# Patient Record
Sex: MALE | Race: WHITE | NOT HISPANIC OR LATINO | URBAN - METROPOLITAN AREA
[De-identification: names, ages, dates, MRNs, and addresses within clinical notes are randomized per-mention and may not be internally consistent; named-entity substitution may affect disease eponyms.]

---

## 2024-10-21 ENCOUNTER — OFFICE VISIT (OUTPATIENT)
Dept: URGENT CARE | Facility: CLINIC | Age: 54
End: 2024-10-21
Payer: COMMERCIAL

## 2024-10-21 VITALS
TEMPERATURE: 98 F | HEART RATE: 73 BPM | BODY MASS INDEX: 44.08 KG/M2 | WEIGHT: 297.63 LBS | OXYGEN SATURATION: 98 % | HEIGHT: 69 IN | RESPIRATION RATE: 18 BRPM | DIASTOLIC BLOOD PRESSURE: 86 MMHG | SYSTOLIC BLOOD PRESSURE: 128 MMHG

## 2024-10-21 DIAGNOSIS — S61.431A PUNCTURE WOUND OF RIGHT HAND WITHOUT FOREIGN BODY, INITIAL ENCOUNTER: Primary | ICD-10-CM

## 2024-10-21 DIAGNOSIS — M79.641 RIGHT HAND PAIN: ICD-10-CM

## 2024-10-21 PROBLEM — E78.5 DYSLIPIDEMIA: Status: ACTIVE | Noted: 2023-03-13

## 2024-10-21 PROBLEM — Z51.81 ANTICOAGULATION MANAGEMENT ENCOUNTER: Status: ACTIVE | Noted: 2023-03-13

## 2024-10-21 PROBLEM — Z79.01 ANTICOAGULATION MANAGEMENT ENCOUNTER: Status: ACTIVE | Noted: 2023-03-13

## 2024-10-21 PROBLEM — I25.10 ISCHEMIC DILATED CARDIOMYOPATHY DUE TO CORONARY ARTERY DISEASE: Status: ACTIVE | Noted: 2024-10-21

## 2024-10-21 PROBLEM — G47.33 OBSTRUCTIVE SLEEP APNEA: Status: ACTIVE | Noted: 2023-03-13

## 2024-10-21 PROBLEM — Z95.810 IMPLANTABLE CARDIOVERTER-DEFIBRILLATOR (ICD) IN SITU: Status: ACTIVE | Noted: 2024-10-21

## 2024-10-21 PROBLEM — F31.9 BIPOLAR DISORDER: Status: ACTIVE | Noted: 2024-10-21

## 2024-10-21 PROBLEM — E66.9 OBESITY: Status: ACTIVE | Noted: 2023-03-13

## 2024-10-21 PROBLEM — E11.9 DIABETES MELLITUS: Status: ACTIVE | Noted: 2023-03-13

## 2024-10-21 PROBLEM — I25.10 CORONARY ARTERY DISEASE INVOLVING NATIVE CORONARY ARTERY WITHOUT ANGINA PECTORIS: Status: ACTIVE | Noted: 2023-03-13

## 2024-10-21 PROBLEM — I25.5 ISCHEMIC DILATED CARDIOMYOPATHY DUE TO CORONARY ARTERY DISEASE: Status: ACTIVE | Noted: 2024-10-21

## 2024-10-21 PROBLEM — I50.20 HEART FAILURE WITH REDUCED EJECTION FRACTION: Status: ACTIVE | Noted: 2024-10-21

## 2024-10-21 PROBLEM — I47.19 PAROXYSMAL ATRIAL TACHYCARDIA: Status: ACTIVE | Noted: 2023-03-13

## 2024-10-21 PROBLEM — I50.22 CHRONIC SYSTOLIC CONGESTIVE HEART FAILURE: Status: ACTIVE | Noted: 2022-02-28

## 2024-10-21 PROBLEM — I10 ESSENTIAL HYPERTENSION: Status: ACTIVE | Noted: 2023-03-13

## 2024-10-21 PROBLEM — Z72.0 TOBACCO USER: Status: ACTIVE | Noted: 2024-10-21

## 2024-10-21 PROBLEM — E78.5 HYPERLIPIDEMIA: Status: ACTIVE | Noted: 2024-10-21

## 2024-10-21 PROCEDURE — 96372 THER/PROPH/DIAG INJ SC/IM: CPT | Mod: 59,S$GLB,, | Performed by: PHYSICIAN ASSISTANT

## 2024-10-21 PROCEDURE — 90715 TDAP VACCINE 7 YRS/> IM: CPT | Mod: S$GLB,,, | Performed by: PHYSICIAN ASSISTANT

## 2024-10-21 PROCEDURE — 90471 IMMUNIZATION ADMIN: CPT | Mod: S$GLB,,, | Performed by: PHYSICIAN ASSISTANT

## 2024-10-21 PROCEDURE — 99203 OFFICE O/P NEW LOW 30 MIN: CPT | Mod: 25,S$GLB,, | Performed by: PHYSICIAN ASSISTANT

## 2024-10-21 RX ORDER — IBUPROFEN 800 MG/1
800 TABLET ORAL EVERY 6 HOURS PRN
Qty: 30 TABLET | Refills: 0 | Status: SHIPPED | OUTPATIENT
Start: 2024-10-22 | End: 2024-11-01

## 2024-10-21 RX ORDER — CEPHALEXIN 500 MG/1
500 CAPSULE ORAL EVERY 6 HOURS
Qty: 28 CAPSULE | Refills: 0 | Status: SHIPPED | OUTPATIENT
Start: 2024-10-21 | End: 2024-10-28

## 2024-10-21 RX ORDER — KETOROLAC TROMETHAMINE 30 MG/ML
30 INJECTION, SOLUTION INTRAMUSCULAR; INTRAVENOUS
Status: COMPLETED | OUTPATIENT
Start: 2024-10-21 | End: 2024-10-21

## 2024-10-21 RX ORDER — MUPIROCIN 20 MG/G
OINTMENT TOPICAL 2 TIMES DAILY
Qty: 30 G | Refills: 0 | Status: SHIPPED | OUTPATIENT
Start: 2024-10-21 | End: 2024-10-31

## 2024-10-21 RX ADMIN — KETOROLAC TROMETHAMINE 30 MG: 30 INJECTION, SOLUTION INTRAMUSCULAR; INTRAVENOUS at 12:10

## 2024-10-21 NOTE — LETTER
"  October 21, 2024      [facility]  [place] [address]  Phone: [phone]  Fax: [phone]       Patient: Jasper Islas   YOB: 1970  Date of Visit: 10/21/2024    To Whom It May Concern:    Veronica Islas  was at [facility] on 10/21/2024. The patient may return to work/school on 10/22/24 with no restrictions. If you have any questions or concerns, or if I can be of further assistance, please do not hesitate to contact me.    Sincerely,        Deepika Sorto PA-C (Jackie)       "

## 2024-10-21 NOTE — PATIENT INSTRUCTIONS
Discussed with patient that if he/she is in pain today, only take tylenol due to toradol injection received in clinic. You can continue NSAIDS tomorrow such as ibuprofen (Motrin/Advil), naproxen (Aleve), Mobic (Meloxicam).     Please keep your wound covered as it heals. Use a thin layer of antibiotic ointment (mupirocin) to help keep the wound moist. This will also keep the dressing from sticking to the wound.  you can gently wash the wound with soap and water. Pat dry and put on a clean dressing. A telfa pad will not stick to the wound.  Change your dressing once a day or every other day.  Always wash your hands before and after touching the wound.  Each time you change the dressing, look closely at the wound to be sure it is healing the right way. The wound may have a yellowish discharge, and this is normal.  Avoid picking the scab or scratching the site which may cause more irritation.  Do not soak in water or swim with an open wound.  Do not use hydrogen peroxide; it will cause the wound to dry out or delay wound healing/new skin growth.  Please complete full course of oral antibiotics.       If not allergic, take Tylenol (Acetaminophen) 650 mg to  1 g every 6 hours as needed for fever/pain and/or Motrin (Ibuprofen) 600 to 800 mg every 6 hours as needed for pain and/or fever      Please remember that you have received care at an urgent care today. Urgent cares are not emergency rooms and are not equipped to handle life threatening emergencies and cannot rule in or out certain medical conditions and you may be released before all of your medical problems are known or treated. Please arrange follow up with your primary care physician or speciality clinic  within 2-5 days if your signs and symptoms have not resolved or worsen. Patient can call our Referral Hotline at [phone] to make an appointment.    Please return here or go to the Emergency Department for any concerns or worsening of condition.Patient was  educated on signs/symptoms that would warrant emergent medical attention. Patient verbalized understanding.  Signs of infection. These include a fever of 100.4°F (38°C) or higher, chills, or wound that will not heal.  The pain in and around the area gets much worse.  There is a bad smell or pus (thick yellow, green, or gray fluid) coming from your wound.  You notice a crunchy feeling or blisters in the skin around the wound.  The redness around your wound gets bigger or is spreading up your arm or leg.  Fluid that is not pus drains from your wound.  Your swelling doesnt improve or gets worse.

## 2024-10-21 NOTE — PROGRESS NOTES
"Subjective:      Patient ID: Jasper Islas is a 54 y.o. male.    Vitals:  height is 5' 9" (1.753 m) and weight is 135 kg (297 lb 9.9 oz). His oral temperature is 98.4 °F (36.9 °C). His blood pressure is 128/86 and his pulse is 73. His respiration is 18 and oxygen saturation is 98%.     Chief Complaint: Puncture Wound    Jasper Islas is a 54 y.o. male with hypertension and DM who complains of  RT hand puncture wound from dirty eliceo nail happened yesterday. Tx include nothing at home. Need Tetanus. He reports pain and swelling to right hand. He is right handed.     Pain  This is a new problem. The current episode started yesterday. The problem occurs constantly. The problem has been unchanged. Associated symptoms include arthralgias and myalgias. Pertinent negatives include no abdominal pain, anorexia, change in bowel habit, chest pain, chills, congestion, coughing, diaphoresis, fatigue, fever, headaches, joint swelling, nausea, neck pain, numbness, rash, sore throat, swollen glands, urinary symptoms, vertigo, visual change, vomiting or weakness. The symptoms are aggravated by twisting and bending. He has tried nothing for the symptoms. The treatment provided no relief.       Constitution: Negative for chills, sweating, fatigue, fever and generalized weakness.   HENT:  Negative for congestion, sore throat, trouble swallowing and voice change.    Neck: Negative for neck pain.   Cardiovascular:  Negative for chest pain, leg swelling, palpitations and sob on exertion.   Respiratory:  Negative for cough, sputum production, shortness of breath, wheezing and asthma.    Gastrointestinal:  Negative for abdominal pain, nausea and vomiting.   Musculoskeletal:  Positive for pain, joint pain and muscle ache. Negative for joint swelling, abnormal ROM of joint, arthritis, gout, back pain, pain with walking, muscle cramps and history of spine disorder.   Skin:  Positive for puncture wound. Negative for rash. "   Allergic/Immunologic: Negative for asthma and immunizations up-to-date.   Neurological:  Negative for history of vertigo, headaches, numbness and tingling.   Psychiatric/Behavioral:  Positive for nervous/anxious. The patient is nervous/anxious.       Objective:     Physical Exam   Constitutional: He is oriented to person, place, and time. No distress.      Comments:Patient is awake and alert, sitting up in exam chair, speaking and answering in complete sentences   normal  HENT:   Head: Normocephalic and atraumatic.   Ears:   Right Ear: Tympanic membrane, external ear and ear canal normal.   Left Ear: Tympanic membrane, external ear and ear canal normal.   Nose: Nose normal.   Mouth/Throat: Mucous membranes are moist. No oropharyngeal exudate or posterior oropharyngeal erythema. Oropharynx is clear.   Eyes: Conjunctivae are normal. Pupils are equal, round, and reactive to light. Extraocular movement intact   Neck: Neck supple.   Cardiovascular: Normal rate, regular rhythm, normal heart sounds and normal pulses.   Pulmonary/Chest: Effort normal and breath sounds normal. No respiratory distress. He has no wheezes. He has no rhonchi. He has no rales.   Abdominal: Normal appearance.   Musculoskeletal: Normal range of motion.         General: Normal range of motion.      Cervical back: He exhibits no tenderness.      Right hand: He exhibits tenderness, bony tenderness and swelling. He exhibits normal range of motion, normal capillary refill, no deformity and no laceration. Normal sensation noted. Normal strength noted.   Lymphadenopathy:     He has no cervical adenopathy.   Neurological: He is alert and oriented to person, place, and time.   Skin: Skin is warm. Capillary refill takes less than 2 seconds.         Comments: Puncture wound to right palm with mild swelling and tenderness; no warmth or purulent drainage noted    Psychiatric: His behavior is normal. Mood, judgment and thought content normal.   Nursing note  and vitals reviewed.      Assessment:     1. Puncture wound of right hand without foreign body, initial encounter    2. Right hand pain      Patient presents with clinical exam findings and history consistent with above.      On exam, patient is nontoxic appearing and vitals are stable.      Diagnostic testing results were reviewed and discussed with patient/guardian.   Tests ordered in clinic: None  Previous progress notes/admissions/labs and medications were reviewed.      Plan:       Puncture wound of right hand without foreign body, initial encounter  -     Tdap (BOOSTRIX) vaccine injection 0.5 mL  -     BANDAGE ELASTIC 4IN ACE NS  -     cephALEXin (KEFLEX) 500 MG capsule; Take 1 capsule (500 mg total) by mouth every 6 (six) hours. for 7 days  Dispense: 28 capsule; Refill: 0  -     mupirocin (BACTROBAN) 2 % ointment; Apply topically 2 (two) times daily. for 10 days  Dispense: 30 g; Refill: 0  -     Ambulatory referral/consult to Internal Medicine    Right hand pain  -     Tdap (BOOSTRIX) vaccine injection 0.5 mL  -     ketorolac injection 30 mg  -     BANDAGE ELASTIC 4IN ACE NS  -     ibuprofen (ADVIL,MOTRIN) 800 MG tablet; Take 1 tablet (800 mg total) by mouth every 6 (six) hours as needed for Pain.  Dispense: 30 tablet; Refill: 0  -     Ambulatory referral/consult to Internal Medicine                  1) See orders for this visit as documented in the electronic medical record.  2) Symptomatic therapy suggested: use acetaminophen/ibuprofen every 6-8 hours prn pain or fever, push fluids.   3) Call or return to clinic prn if these symptoms worsen or fail to improve as anticipated.    Discussed results/diagnosis/plan with patient in clinic.  We had shared decision making for patient's treatment. Patient verbalized understanding and in agreement with current treatment plan.     Patient was instructed to return for re-evaluation with urgent care or PCP for continued outpatient workup and management if symptoms do  "not improve/worsening symptoms. Strict ED versus clinic precautions given in depth.    Discharge and follow-up instructions given verbally/printed with the patient who expressed understanding. The instructions and results are also available on [facility].              [facility] "José Miguel Sorto PA-C          Patient Instructions   Discussed with patient that if he/she is in pain today, only take tylenol due to toradol injection received in clinic. You can continue NSAIDS tomorrow such as ibuprofen (Motrin/Advil), naproxen (Aleve), Mobic (Meloxicam).     Please keep your wound covered as it heals. Use a thin layer of antibiotic ointment (mupirocin) to help keep the wound moist. This will also keep the dressing from sticking to the wound.  you can gently wash the wound with soap and water. Pat dry and put on a clean dressing. A telfa pad will not stick to the wound.  Change your dressing once a day or every other day.  Always wash your hands before and after touching the wound.  Each time you change the dressing, look closely at the wound to be sure it is healing the right way. The wound may have a yellowish discharge, and this is normal.  Avoid picking the scab or scratching the site which may cause more irritation.  Do not soak in water or swim with an open wound.  Do not use hydrogen peroxide; it will cause the wound to dry out or delay wound healing/new skin growth.  Please complete full course of oral antibiotics.       If not allergic, take Tylenol (Acetaminophen) 650 mg to  1 g every 6 hours as needed for fever/pain and/or Motrin (Ibuprofen) 600 to 800 mg every 6 hours as needed for pain and/or fever      Please remember that you have received care at an urgent care today. Urgent cares are not emergency rooms and are not equipped to handle life threatening emergencies and cannot rule in or out certain medical conditions and you may be released before all of your medical problems are known or treated. Please arrange " follow up with your primary care physician or speciality clinic  within 2-5 days if your signs and symptoms have not resolved or worsen. Patient can call our Referral Hotline at [phone] to make an appointment.    Please return here or go to the Emergency Department for any concerns or worsening of condition.Patient was educated on signs/symptoms that would warrant emergent medical attention. Patient verbalized understanding.  Signs of infection. These include a fever of 100.4°F (38°C) or higher, chills, or wound that will not heal.  The pain in and around the area gets much worse.  There is a bad smell or pus (thick yellow, green, or gray fluid) coming from your wound.  You notice a crunchy feeling or blisters in the skin around the wound.  The redness around your wound gets bigger or is spreading up your arm or leg.  Fluid that is not pus drains from your wound.  Your swelling doesnt improve or gets worse.